# Patient Record
Sex: MALE | Race: WHITE | Employment: FULL TIME | URBAN - METROPOLITAN AREA
[De-identification: names, ages, dates, MRNs, and addresses within clinical notes are randomized per-mention and may not be internally consistent; named-entity substitution may affect disease eponyms.]

---

## 2023-02-23 ENCOUNTER — HOSPITAL ENCOUNTER (EMERGENCY)
Age: 28
Discharge: HOME | End: 2023-02-23

## 2023-02-23 VITALS
TEMPERATURE: 98.6 F | HEART RATE: 90 BPM | SYSTOLIC BLOOD PRESSURE: 136 MMHG | OXYGEN SATURATION: 99 % | DIASTOLIC BLOOD PRESSURE: 96 MMHG | RESPIRATION RATE: 16 BRPM

## 2023-02-23 DIAGNOSIS — T15.91XA FOREIGN BODY OF RIGHT EYE, INITIAL ENCOUNTER: Primary | ICD-10-CM

## 2023-02-23 PROCEDURE — 6370000000 HC RX 637 (ALT 250 FOR IP): Performed by: EMERGENCY MEDICINE

## 2023-02-23 PROCEDURE — 99283 EMERGENCY DEPT VISIT LOW MDM: CPT

## 2023-02-23 RX ORDER — TOBRAMYCIN AND DEXAMETHASONE 3; 1 MG/ML; MG/ML
SUSPENSION/ DROPS OPHTHALMIC
Qty: 10 ML | Refills: 0 | Status: SHIPPED | OUTPATIENT
Start: 2023-02-23 | End: 2023-03-05

## 2023-02-23 RX ORDER — TETRACAINE HYDROCHLORIDE 5 MG/ML
1 SOLUTION OPHTHALMIC ONCE
Status: COMPLETED | OUTPATIENT
Start: 2023-02-23 | End: 2023-02-23

## 2023-02-23 RX ADMIN — TETRACAINE HYDROCHLORIDE 1 DROP: 5 SOLUTION OPHTHALMIC at 19:29

## 2023-02-23 RX ADMIN — FLUORESCEIN SODIUM 2 MG: 1 STRIP OPHTHALMIC at 19:29

## 2023-02-23 ASSESSMENT — VISUAL ACUITY
OD: 20/40
OS: 20/25

## 2023-02-23 ASSESSMENT — LIFESTYLE VARIABLES: HOW OFTEN DO YOU HAVE A DRINK CONTAINING ALCOHOL: NEVER

## 2023-02-23 ASSESSMENT — PAIN - FUNCTIONAL ASSESSMENT: PAIN_FUNCTIONAL_ASSESSMENT: NONE - DENIES PAIN

## 2023-02-23 NOTE — Clinical Note
Maren Vicente was seen and treated in our emergency department on 2/23/2023. He may return to work on . If you have any questions or concerns, please don't hesitate to call.       Salena Markham PA-C

## 2023-02-24 NOTE — ED PROVIDER NOTES
Shared ANNIE-ED Attending Visit. CC: No       Department of Emergency Medicine   ED  Encounter Note  Admit Date/RoomTime: 2023  7:17 PM  ED Room:     NAME: Randee Lin  : 1995  MRN: [id number]     Chief Complaint:  Foreign Body in Eye (Possible metal in right eye .)    History of Present Illness        Randee Lin is a 32 y.o. old male presenting to the emergency department by private vehicle, for traumatic sudden onset retained FB to right eye, which began a few hour(s) prior to arrival.  There has been no obvious mechanism causing complaint and grinding metal.  Since onset his symptoms have been remaining constant and severe in severity. Associated signs & symptoms of: nothing additional. The patients tetanus status is within past 5 years. History of:     []   Glaucoma     []   Recent Eye Surgery     ROS   Pertinent positives and negatives are stated within HPI, all other systems reviewed and are negative. Past Medical History:  has no past medical history on file. Surgical History:  has no past surgical history on file. Social History:  reports that he has been smoking cigarettes. He does not have any smokeless tobacco history on file. He reports that he does not currently use alcohol. He reports that he does not currently use drugs. Family History: family history is not on file. Allergies: Patient has no known allergies. Physical Exam   Oxygen Saturation Interpretation: Normal.        ED Triage Vitals [23 1853]   BP Temp Temp Source Heart Rate Resp SpO2 Height Weight   (!) 136/96 98.6 °F (37 °C) Oral 90 16 99 % -- --       Physical Exam  Constitutional:  Alert, development consistent with age. HENT:  NC/NT. Airway patent. Neck:  Normal ROM. Supple. Eyes:         Pupils: equal, round, reactive to light and accommodation. Eyelids: Bilateral upper and lower Swelling/redness:  no swelling or erythema. Conjunctiva: Right injected(red).           Sclera: Bilateral non-icteric . Cornea: Right foreign body at the 8 o clock position. EOM:  Intact Bilaterally. Fundoscopic:  grossly normal- discs sharp. Visual Acuity:  Within Normal Limit. Integument:  No rashes, erythema present, unless noted elsewhere. Lymphatics: No lymphangitis or adenopathy noted. Neurological:  Oriented. Motor functions intact. Lab / Imaging Results   (All laboratory and radiology results have been personally reviewed by myself)  Labs:  No results found for this visit on 02/23/23. Imaging: All Radiology results interpreted by Radiologist unless otherwise noted. No orders to display       ED Course / Medical Decision Making     Medications   tetanus & diphtheria toxoids (adult) 5-2 LFU injection 0.5 mL (0.5 mLs IntraMUSCular Not Given 2/23/23 1928)   tetracaine (TETRAVISC) 0.5 % ophthalmic solution 1 drop (1 drop Ophthalmic Given 2/23/23 1929)   fluorescein ophthalmic strip 2 mg (2 mg Both Eyes Given 2/23/23 1929)        Re-examination:  2/23/23       Time: 2030  Patients condition is improving after treatment. Consult(s):   None    Procedure(s):     PROCEDURE  2/23/23       Time: 2015    FOREIGN BODY REMOVAL  Risks, benefits and alternatives (for applicable procedures below) described. Performed By: Tyrel Pritchett PA-C and EM Attending Physician. Location:   Foreign body of Right eye. Informed consent: Verbal consent obtained. The patient was counseled regarding the procedure in person, it's indications, risks, potential complications and alternatives and any questions were answered. Verbal consent was obtained. Skin Prep:  none  required. Local Anesthesia:  tetracaine. The patient's head was positioned appropriately and the foreign body was removed using eye seymour. .  Complications: none. Patient tolerated the procedure well. MDM:   Patient presents to the emergency department for a foreign body of his right eye.   A foreign body was removed using the technique above. Tetanus is already up-to-date. He denies any additional complaints. Patient highly encouraged to follow-up with ophthalmology. Should call tomorrow to schedule appointment early next week. Tobra mycin for antibiotic prophylaxis. Return immediately for new or worsening symptoms. Plan of Care/Counseling:  Maria Luisa Walker PA-C reviewed today's visit with the patient in addition to providing specific details for the plan of care and counseling regarding the diagnosis and prognosis. Questions are answered at this time and are agreeable with the plan. Assessment      1. Foreign body of right eye, initial encounter      Plan   Discharged home. Patient condition is good    New Medications     New Prescriptions    TOBRAMYCIN-DEXAMETHASONE (TOBRADEX) 0.3-0.1 % OPHTHALMIC SUSPENSION    Use 2 drops to affected eyes every 4 hrs while wake x 5 days     Electronically signed by Maria Luisa Walker PA-C   DD: 2/23/23  **This report was transcribed using voice recognition software. Every effort was made to ensure accuracy; however, inadvertent computerized transcription errors may be present.   END OF ED PROVIDER NOTE        Maria Luisa Walker PA-C  02/23/23 2037       Maria Luisa Walker PA-C  02/23/23 2037

## 2024-06-11 ENCOUNTER — HOSPITAL ENCOUNTER (EMERGENCY)
Facility: HOSPITAL | Age: 29
Discharge: OTHER INSTITUTION | End: 2024-06-11
Attending: STUDENT IN AN ORGANIZED HEALTH CARE EDUCATION/TRAINING PROGRAM
Payer: COMMERCIAL

## 2024-06-11 ENCOUNTER — HOSPITAL ENCOUNTER (EMERGENCY)
Facility: HOSPITAL | Age: 29
Discharge: HOME | End: 2024-06-11
Attending: EMERGENCY MEDICINE
Payer: COMMERCIAL

## 2024-06-11 VITALS
SYSTOLIC BLOOD PRESSURE: 153 MMHG | HEIGHT: 70 IN | OXYGEN SATURATION: 99 % | DIASTOLIC BLOOD PRESSURE: 96 MMHG | BODY MASS INDEX: 34.36 KG/M2 | HEART RATE: 79 BPM | RESPIRATION RATE: 16 BRPM | TEMPERATURE: 98.2 F | WEIGHT: 240 LBS

## 2024-06-11 VITALS
HEIGHT: 70 IN | BODY MASS INDEX: 34.36 KG/M2 | TEMPERATURE: 98.1 F | SYSTOLIC BLOOD PRESSURE: 177 MMHG | WEIGHT: 240 LBS | RESPIRATION RATE: 16 BRPM | HEART RATE: 84 BPM | OXYGEN SATURATION: 98 % | DIASTOLIC BLOOD PRESSURE: 124 MMHG

## 2024-06-11 DIAGNOSIS — S05.32XA CORNEAL LACERATION OF LEFT EYE, INITIAL ENCOUNTER: Primary | ICD-10-CM

## 2024-06-11 DIAGNOSIS — T15.02XA CORNEAL FOREIGN BODY, LEFT, INITIAL ENCOUNTER: Primary | ICD-10-CM

## 2024-06-11 LAB
ABO GROUP (TYPE) IN BLOOD: NORMAL
ALBUMIN SERPL BCP-MCNC: 4.3 G/DL (ref 3.4–5)
ALP SERPL-CCNC: 74 U/L (ref 33–120)
ALT SERPL W P-5'-P-CCNC: 90 U/L (ref 10–52)
ANION GAP SERPL CALC-SCNC: 14 MMOL/L (ref 10–20)
ANTIBODY SCREEN: NORMAL
APTT PPP: 33 SECONDS (ref 27–38)
AST SERPL W P-5'-P-CCNC: 42 U/L (ref 9–39)
BASOPHILS # BLD AUTO: 0.08 X10*3/UL (ref 0–0.1)
BASOPHILS NFR BLD AUTO: 1.3 %
BILIRUB SERPL-MCNC: 0.8 MG/DL (ref 0–1.2)
BUN SERPL-MCNC: 14 MG/DL (ref 6–23)
CALCIUM SERPL-MCNC: 9.3 MG/DL (ref 8.6–10.6)
CHLORIDE SERPL-SCNC: 106 MMOL/L (ref 98–107)
CO2 SERPL-SCNC: 25 MMOL/L (ref 21–32)
CREAT SERPL-MCNC: 1.02 MG/DL (ref 0.5–1.3)
EGFRCR SERPLBLD CKD-EPI 2021: >90 ML/MIN/1.73M*2
EOSINOPHIL # BLD AUTO: 0.23 X10*3/UL (ref 0–0.7)
EOSINOPHIL NFR BLD AUTO: 3.8 %
ERYTHROCYTE [DISTWIDTH] IN BLOOD BY AUTOMATED COUNT: 11.9 % (ref 11.5–14.5)
GLUCOSE SERPL-MCNC: 86 MG/DL (ref 74–99)
HCT VFR BLD AUTO: 44.5 % (ref 41–52)
HGB BLD-MCNC: 15.5 G/DL (ref 13.5–17.5)
IMM GRANULOCYTES # BLD AUTO: 0.01 X10*3/UL (ref 0–0.7)
IMM GRANULOCYTES NFR BLD AUTO: 0.2 % (ref 0–0.9)
INR PPP: 1 (ref 0.9–1.1)
LYMPHOCYTES # BLD AUTO: 2.51 X10*3/UL (ref 1.2–4.8)
LYMPHOCYTES NFR BLD AUTO: 41.2 %
MCH RBC QN AUTO: 29 PG (ref 26–34)
MCHC RBC AUTO-ENTMCNC: 34.8 G/DL (ref 32–36)
MCV RBC AUTO: 83 FL (ref 80–100)
MONOCYTES # BLD AUTO: 0.54 X10*3/UL (ref 0.1–1)
MONOCYTES NFR BLD AUTO: 8.9 %
NEUTROPHILS # BLD AUTO: 2.72 X10*3/UL (ref 1.2–7.7)
NEUTROPHILS NFR BLD AUTO: 44.6 %
NRBC BLD-RTO: 0 /100 WBCS (ref 0–0)
PLATELET # BLD AUTO: 239 X10*3/UL (ref 150–450)
POTASSIUM SERPL-SCNC: 3.9 MMOL/L (ref 3.5–5.3)
PROT SERPL-MCNC: 7.1 G/DL (ref 6.4–8.2)
PROTHROMBIN TIME: 11.2 SECONDS (ref 9.8–12.8)
RBC # BLD AUTO: 5.34 X10*6/UL (ref 4.5–5.9)
RH FACTOR (ANTIGEN D): NORMAL
SODIUM SERPL-SCNC: 141 MMOL/L (ref 136–145)
WBC # BLD AUTO: 6.1 X10*3/UL (ref 4.4–11.3)

## 2024-06-11 PROCEDURE — 36415 COLL VENOUS BLD VENIPUNCTURE: CPT

## 2024-06-11 PROCEDURE — 85730 THROMBOPLASTIN TIME PARTIAL: CPT

## 2024-06-11 PROCEDURE — 80053 COMPREHEN METABOLIC PANEL: CPT

## 2024-06-11 PROCEDURE — 65205 REMOVE FOREIGN BODY FROM EYE: CPT

## 2024-06-11 PROCEDURE — 86850 RBC ANTIBODY SCREEN: CPT

## 2024-06-11 PROCEDURE — 99284 EMERGENCY DEPT VISIT MOD MDM: CPT

## 2024-06-11 PROCEDURE — 99283 EMERGENCY DEPT VISIT LOW MDM: CPT

## 2024-06-11 PROCEDURE — 85025 COMPLETE CBC W/AUTO DIFF WBC: CPT

## 2024-06-11 RX ORDER — MORPHINE SULFATE 4 MG/ML
4 INJECTION INTRAVENOUS ONCE
Status: DISCONTINUED | OUTPATIENT
Start: 2024-06-11 | End: 2024-06-11 | Stop reason: HOSPADM

## 2024-06-11 RX ORDER — MOXIFLOXACIN 5 MG/ML
1 SOLUTION/ DROPS OPHTHALMIC 4 TIMES DAILY
Qty: 3 ML | Refills: 0 | Status: SHIPPED | OUTPATIENT
Start: 2024-06-11 | End: 2024-06-21

## 2024-06-11 ASSESSMENT — COLUMBIA-SUICIDE SEVERITY RATING SCALE - C-SSRS
6. HAVE YOU EVER DONE ANYTHING, STARTED TO DO ANYTHING, OR PREPARED TO DO ANYTHING TO END YOUR LIFE?: NO
6. HAVE YOU EVER DONE ANYTHING, STARTED TO DO ANYTHING, OR PREPARED TO DO ANYTHING TO END YOUR LIFE?: NO
1. IN THE PAST MONTH, HAVE YOU WISHED YOU WERE DEAD OR WISHED YOU COULD GO TO SLEEP AND NOT WAKE UP?: NO
2. HAVE YOU ACTUALLY HAD ANY THOUGHTS OF KILLING YOURSELF?: NO
1. IN THE PAST MONTH, HAVE YOU WISHED YOU WERE DEAD OR WISHED YOU COULD GO TO SLEEP AND NOT WAKE UP?: NO
2. HAVE YOU ACTUALLY HAD ANY THOUGHTS OF KILLING YOURSELF?: NO

## 2024-06-11 ASSESSMENT — PAIN - FUNCTIONAL ASSESSMENT
PAIN_FUNCTIONAL_ASSESSMENT: 0-10
PAIN_FUNCTIONAL_ASSESSMENT: 0-10

## 2024-06-11 ASSESSMENT — PAIN SCALES - GENERAL
PAINLEVEL_OUTOF10: 5 - MODERATE PAIN
PAINLEVEL_OUTOF10: 0 - NO PAIN

## 2024-06-11 ASSESSMENT — PAIN DESCRIPTION - PAIN TYPE: TYPE: ACUTE PAIN

## 2024-06-11 NOTE — DISCHARGE INSTRUCTIONS
Use both eye drops 4 times a day for 10 days or until ophthalmology tells you to discontinue.  Follow up with ophthalmology in 1 week. They will call and help schedule with you

## 2024-06-11 NOTE — ED TRIAGE NOTES
"Pt to ED via private vehicle from home c/o foreign body to L eye, states was grinding metal, had safety glasses on, thinks piece of metal went up under safety glasses. Occurred approximately 2 hours ago, states \"feels something in there\", denies pain, c/o trouble opening eye, visual disturbance  "

## 2024-06-11 NOTE — ED PROVIDER NOTES
HPI   Chief Complaint   Patient presents with    Eye Pain       This patient is a 28-year-old male with no significant past medical history presenting today for ophthalmology consultation.  He initially presented last night at an outside hospital at [facility] for concerns of foreign body in his left eye.  Reports that he was grinding metal around 11 PM last night.  Notes that he was wearing safety glasses that he does believe that a piece of metal went up under the safety glasses and struck his eye.  He reports trying to irrigate his eyes however feels like there is something still there.  Reports cloudy vision to the left eye and pain with eye movement.  Was evaluated at [facility].  They performed a fluorescein stain/slit-lamp which shows possible rust ring over his iris.                          Matthieu Coma Scale Score: 15                     Patient History   No past medical history on file.  No past surgical history on file.  No family history on file.  Social History     Tobacco Use    Smoking status: Every Day     Current packs/day: 1.00     Types: Cigarettes    Smokeless tobacco: Never   Substance Use Topics    Alcohol use: Not Currently    Drug use: Never       Physical Exam   ED Triage Vitals [06/11/24 0704]   Temperature Heart Rate Respirations BP   36.8 °C (98.2 °F) 79 16 (!) 153/96      Pulse Ox Temp Source Heart Rate Source Patient Position   99 % Temporal Monitor --      BP Location FiO2 (%)     -- --       Physical Exam    ED Course & MDM   Diagnoses as of 06/11/24 1055   Corneal foreign body, left, initial encounter       Medical Decision Making  28-year-old male presenting today as transfer for ophthalmology consult.  Endorses visual blurriness and eye pain with eye movement.  PERRLA.  EOM intact.  Ophthalmology consulted and they came down to evaluate patient.  Ophthalmology reports finding of foreign body in his cornea which a removed successfully.  Recommending this patient started on  moxifloxacin, artificial tears and follow-up in their outpatient office in 1 week for which they will set up the appointment.  See ophthalmology consult for full exam and evaluation.  Return precautions were provided to the patient.    Case discussed with ED attending        Procedure  Procedures     Victoria Smith PA-C  06/11/24 1056

## 2024-06-11 NOTE — CONSULTS
Reason for consult: foreign body, left eye    HPI: 27yo male presenting after grinding metal yesterday with concern for foreign body in left eye. Reports he has mild eye irritation, pain, blurry vision. States this has happened to him before. Was wearing safety glasses.     Denies flashes of light, floating spots, double vision, or sudden vision loss.    Past Ocular History: prior corneal foreign body   Past Medical History: as above  Family History: reviewed and not pertinent to chief complaint  Medications: please refer to medication reconciliation  Allergies: please refer to patient allergy list    Base Eye Exam       Visual Acuity (Snellen - Linear)         Right Left    Near sc 20/20 20/20              Tonometry (Tonopen, 9:18 AM)         Right Left    Pressure 9 8              Pupils         Pupils Dark Light Shape React APD    Right PERRL, No APD 5 3 Round Brisk None    Left PERRL, No APD 5 3 Round Brisk None              Visual Fields         Left Right     Full Full              Extraocular Movement         Right Left     Full Full                  Additional Tests       Color         Right Left    Ishihara 11/11 11/11                  Slit Lamp and Fundus Exam       External Exam         Right Left    External Normal Normal              Slit Lamp Exam         Right Left    Lids/Lashes Normal Normal    Conjunctiva/Sclera White and quiet tr injection    Cornea 6 oc paracentral 1mm circular scar with surrounding haze paracentral <1mm steel fragment embedded into superficial cornea with 1mm epi defect; 6oc paracentral 1mm circular opaque scar    Anterior Chamber Deep and quiet Deep and quiet    Iris Round and reactive Round and reactive    Lens Clear Clear    Anterior Vitreous Normal Normal              Fundus Exam         Right Left    Disc blurry margins circumferentially with no elevation blurry margins circumferentially with no elevation    C/D Ratio 0.2 0.2    Macula good foveal reflex good foveal reflex     Vessels Normal Normal    Periphery Normal Normal                   B-scan: no clear hyperdense drusen deposits in either eye     A&P:  #Corneal foreign body, left eye  - 29yo male presenting after grinding steel with foreign body embedded in left cornea and sx of blurry vision, eye irritation.   - Exam today reassuring with 20/20 VA OU, normal IOP, normal pupillary exam.   - SLE notable for <1mm steel fragment embedded into left paracentral cornea with faint opaque 1mm circular scar inf at 6oc. 1mm circular epi defect noted at region of steel fragment. SLE OD also with scar at 6oc with surrounding haze likely from prior foreign body.   - DFE wnl aside from blurred optic disc margins as noted below.   - Foreign body removed with 27 gauge needle under betadine cover. Carito negative. Resultant abrasion measured 1mm circularly. Fornices swept.     Recommendations:   - RTC in 1 week to comp/optom   - Start moxifloxacin qid, left eye  - Start artificial tears qid, left eye  - ED return precautions provided     #Blurred optic disc margins  - Pt presenting with bilateral blurred optic disc margins with no evidence of elevation   - This could be normal variant, vs optic disc drusen (although less likely given negative B-scan) vs optic nerve gliosis vs less likely trace optic disc edema given excellent vision, color vision, lack of afferent pupillary defect (APD), and lack of headaches, pulsatile tinnitus, or TVOs.     Recommendations:   - OCT RNFL at follow-up visit   - HVF 24-2 at follow up visit       Almaz Barth MD  Ophthalmology, PGY2    Note not final until signed by attending physician    Ophthalmology Adult Pager: [phone]  Ophthalmology Peds Pager: [phone]    For adult follow up appts, call [phone]  For pediatric follow up appts, call [phone]

## 2024-06-11 NOTE — Clinical Note
Kahlil Conti was seen and treated in our emergency department on 6/11/2024.  He may return to work on 06/12/2024.       If you have any questions or concerns, please don't hesitate to call.      Oral Noland, DO

## 2024-06-11 NOTE — ED PROVIDER NOTES
"    HPI   Chief Complaint   Patient presents with    Foreign Body in Eye       HPI: 28-year-old male, otherwise healthy, he is presenting to the emergency department today for concern for foreign body in his left eye.  He states that he was grinding metal and had safety glasses on, he believes that a piece of the metal went up under the safety glasses, occurred about 2 hours prior to arrival.  He did try irrigating out his eyes however stills feels like something is there.  He feels a cloudiness with a foreign body sensation in his left eye.  Reports that this has happened in the past and says they had to do \"scraping\"      ROS: Complete review of systems performed, otherwise negative except as noted in the history of present illness    PMH: Reviewed, documented below in note. Pertinents in HPI  PSH: Reviewed and documented below in note. Pertinents in HPI  SH: No illicits, not homeless.  Fam: Reviewed, noncontributory to patients current complaint  MEDS: Reviewed and documented below in note. Pertinents in HPI  ALLERGIES: Reviewed and documented below in note.          History provided by:  Patient   used: No                          Matthieu Coma Scale Score: 15                  Patient History   History reviewed. No pertinent past medical history.  History reviewed. No pertinent surgical history.  No family history on file.  Social History     Tobacco Use    Smoking status: Every Day     Current packs/day: 1.00     Types: Cigarettes    Smokeless tobacco: Never   Substance Use Topics    Alcohol use: Not Currently    Drug use: Never       Physical Exam   Visit Vitals  BP (!) 177/124   Pulse 84   Temp 36.7 °C (98.1 °F) (Oral)   Resp 16   Ht 1.778 m (5' 10\")   Wt 109 kg (240 lb)   SpO2 98%   BMI 34.44 kg/m²   Smoking Status Every Day   BSA 2.32 m²      Physical Exam  Vitals and nursing note reviewed.   Constitutional:       Appearance: Normal appearance.   HENT:      Head: Normocephalic and " atraumatic.   Eyes:      Comments: Pupils are equally round and reactive to light and accommodation, extraocular movements intact, there is scleral injection and conjunctival injection of the left eye when compared to the right.  There is mild tearing.  No foreign body noted.  On slit-lamp examination there is evidence concerning for a rust ring overlying the cornea and iris area.  Upon staining the eye, this rust ring almost looks like a corneal laceration as it appears that some of this stain is going deep to the cornea and into the anterior chamber area.   Cardiovascular:      Rate and Rhythm: Normal rate and regular rhythm.      Pulses: Normal pulses.      Heart sounds: Normal heart sounds.   Pulmonary:      Effort: Pulmonary effort is normal.      Breath sounds: Normal breath sounds.   Musculoskeletal:      Cervical back: Normal range of motion.   Skin:     General: Skin is dry.   Neurological:      Mental Status: He is alert.         No orders to display       Labs Reviewed - No data to display      ED Course & MDM   Diagnoses as of 06/12/24 0403   Corneal laceration of left eye, initial encounter           Medical Decision Making  All mentioned lab results, ECGs, and imaging were independently reviewed by myself  - Patient evaluated. Patient presenting to the emergency department today for concern for foreign body in his left eye.  Based on my history and examination I am concerned about a rust ring versus concern for corneal laceration.  I did reach out and speak with the on-call ophthalmologist at [facility] Dr. Saldana who is recommending transfer to [facility] for ophthalmology evaluation.  Patient initially was getting go by S as he did not have a ride although he was able to get a ride and so patient was transferred via private vehicle.  He was instructed to go straight to the [facility] emergency department, remain NPO.  Expressed understanding with this.  He was transferred in otherwise stable condition for  ophthalmology consult      - Monitored for any changes in stability or symptomatology. Patient remained stable.   - Counseled regarding labs, imaging, diagnosis, and plan. Patient was agreeable. All questions were answered. The patient was receptive and agreeable to the plan of care.       *Disclaimer: This note was dictated by speech recognition. Minor errors in transcription may be present. Please call with questions.    Escobar Hamm MD             Your medication list      as of June 11, 2024  5:34 AM     You have not been prescribed any medications.         Procedure  Procedures     *This report was transcribed using voice recognition software.  Every effort was made to ensure accuracy; however, inadvertent computerized transcription errors may be present.*  Mitch Hamm MD  06/12/24         Mitch Hamm MD  06/12/24 0402

## 2024-06-11 NOTE — ED TRIAGE NOTES
Pt is TRN from [place] to ED for foreign body to L eye, states was grinding metal, had safety glasses on, thinks piece of metal went up under safety glasses. Endorses blurry vision to left eye.

## 2024-11-23 ENCOUNTER — HOSPITAL ENCOUNTER (EMERGENCY)
Facility: HOSPITAL | Age: 29
Discharge: HOME | End: 2024-11-23
Attending: EMERGENCY MEDICINE
Payer: COMMERCIAL

## 2024-11-23 VITALS
WEIGHT: 230 LBS | DIASTOLIC BLOOD PRESSURE: 104 MMHG | OXYGEN SATURATION: 98 % | HEART RATE: 83 BPM | RESPIRATION RATE: 16 BRPM | HEIGHT: 70 IN | BODY MASS INDEX: 32.93 KG/M2 | SYSTOLIC BLOOD PRESSURE: 167 MMHG | TEMPERATURE: 98.2 F

## 2024-11-23 DIAGNOSIS — M75.22 BICEPS TENDINITIS OF LEFT UPPER EXTREMITY: Primary | ICD-10-CM

## 2024-11-23 PROCEDURE — 99282 EMERGENCY DEPT VISIT SF MDM: CPT

## 2024-11-23 PROCEDURE — 2500000001 HC RX 250 WO HCPCS SELF ADMINISTERED DRUGS (ALT 637 FOR MEDICARE OP): Performed by: EMERGENCY MEDICINE

## 2024-11-23 RX ORDER — IBUPROFEN 600 MG/1
600 TABLET ORAL ONCE
Status: COMPLETED | OUTPATIENT
Start: 2024-11-23 | End: 2024-11-23

## 2024-11-23 RX ORDER — IBUPROFEN 600 MG/1
600 TABLET ORAL EVERY 8 HOURS PRN
Qty: 30 TABLET | Refills: 0 | Status: SHIPPED | OUTPATIENT
Start: 2024-11-23

## 2024-11-23 ASSESSMENT — LIFESTYLE VARIABLES
EVER HAD A DRINK FIRST THING IN THE MORNING TO STEADY YOUR NERVES TO GET RID OF A HANGOVER: NO
HAVE YOU EVER FELT YOU SHOULD CUT DOWN ON YOUR DRINKING: NO
EVER FELT BAD OR GUILTY ABOUT YOUR DRINKING: NO
TOTAL SCORE: 0
HAVE PEOPLE ANNOYED YOU BY CRITICIZING YOUR DRINKING: NO

## 2024-11-23 ASSESSMENT — PAIN SCALES - GENERAL
PAINLEVEL_OUTOF10: 8
PAINLEVEL_OUTOF10: 8

## 2024-11-23 ASSESSMENT — COLUMBIA-SUICIDE SEVERITY RATING SCALE - C-SSRS
2. HAVE YOU ACTUALLY HAD ANY THOUGHTS OF KILLING YOURSELF?: NO
1. IN THE PAST MONTH, HAVE YOU WISHED YOU WERE DEAD OR WISHED YOU COULD GO TO SLEEP AND NOT WAKE UP?: NO
6. HAVE YOU EVER DONE ANYTHING, STARTED TO DO ANYTHING, OR PREPARED TO DO ANYTHING TO END YOUR LIFE?: NO

## 2024-11-23 ASSESSMENT — PAIN - FUNCTIONAL ASSESSMENT: PAIN_FUNCTIONAL_ASSESSMENT: 0-10

## 2024-11-23 NOTE — Clinical Note
Kahlil Conti was seen and treated in our emergency department on 11/23/2024.  He may return to work on 11/25/2024.  Patient medically cleared to return to work on November 25 with restrictions until 12/2/24. No heavy lifting of greater than 10 lbs with left arm.      If you have any questions or concerns, please don't hesitate to call.      Olive Cruz MD

## 2024-11-23 NOTE — ED PROVIDER NOTES
HPI   Chief Complaint   Patient presents with    .     Left upper arm pain, denies any injury. Started 2 weeks ago.       HPI:  29-year-old male who denies any significant past medical problems presents the emergency department complaining of left arm pain.  He works as a  and for the last 2 weeks has noted pain over his biceps tendon insertion site.  He denies any injury he does have issues with carpal tunnel he does repetitive work at his job which may be contributing to his biceps tendinitis.  He denies any fevers or chills he has not tried to take anything for pain he denies any numbness or tingling and no injury    Limitations to history: [mental status, language barrier, intoxication, behavioral affect]  Independent Historians: None  External Records Reviewed: EMR record  ------------------------------------------------------------------------------------------------------------------------------------------  ROS: a ten point review of systems was performed and negative except as per HPI.  ------------------------------------------------------------------------------------------------------------------------------------------  PMH / PSH: as per HPI, reviewed in EMR and discussed with the patient []  MEDS:  reviewed in EMR and discussed with the patient []  ALLERGIES: reviewed in EMR[]  SocH:  as per HPI, otherwise reviewed in EMR []  FH:  as per HPI, otherwise reviewed in EMR []  ------------------------------------------------------------------------------------------------------------------------------------------  Physical Exam:  VS: As documented in the triage note and EMR flowsheet from this visit was reviewed  General: Well appearing. No acute distress.   Eyes:  Extraocular movements grossly intact. No scleral icterus.   HEENT: Atraumatic. Normocephalic.    Neck: Supple. No gross masses  CV: RRR, audible S1/S2, 2+ symmetric peripheral pulses  Resp: Clear to auscultation bilaterally. No respiratory  distress.  Non-labored respirations  GI: Soft, non-tender, non-distended, no rebound or gaurding  MSK: Reproducible tenderness to palpation over the left biceps tendon tendon feels intact full range of motion but limited due to pain symmetric muscle bulk. No gross step offs or deformities.  Skin: Warm, dry, no obvious rash.  Neuro: Speech fluent. Awake. Alert. Appropriate conversation.  Psych: Appropriate mood and affect for situation  ------------------------------------------------------------------------------------------------------------------------------------------  Hospital Course / Medical Decision Making:  Patient is well-appearing on my assessment no need for any x-ray imaging as I do not suspect there is any underlying bony pathology since he has no history of traumatic injury and he has reproducible tenderness palpation over the biceps muscle and tendon I believe it is likely a biceps tendinitis from overuse due to his job.  He says he is unfortunately not able to take time off of work but I did provide him a work excuse with limited restrictions to try to help I will give him time to heal his biceps tendon and have prescribed NSAID therapy he was also advised to ice and use heat therapy for the biceps tendon and to follow-up with Ortho as an outpatient.  Patient was given ibuprofen orally here and a prescription for ibuprofen 600 was provided as well he was discharged from the ED in stable condition    Final diagnosis and disposition: [] Biceps tendinitis of the left arm            Olive Cruz MD                          Patient History   No past medical history on file.  No past surgical history on file.  No family history on file.  Social History     Tobacco Use    Smoking status: Every Day     Current packs/day: 1.00     Types: Cigarettes    Smokeless tobacco: Never   Substance Use Topics    Alcohol use: Not Currently    Drug use: Never       Physical Exam   ED Triage Vitals [11/23/24 0013]    Temperature Heart Rate Respirations BP   36.8 °C (98.2 °F) 83 16 (!) 167/104      Pulse Ox Temp Source Heart Rate Source Patient Position   98 % Tympanic -- --      BP Location FiO2 (%)     -- --       Physical Exam      ED Course & MDM   Diagnoses as of 11/23/24 0041   Biceps tendinitis of left upper extremity                 No data recorded     [place] Coma Scale Score: 15 (11/23/24 0013 : Jen Ruffin RN)                           Medical Decision Making      Procedure  Procedures     Olive Cruz MD  11/23/24 0044